# Patient Record
Sex: MALE | Race: WHITE | NOT HISPANIC OR LATINO | Employment: OTHER | URBAN - METROPOLITAN AREA
[De-identification: names, ages, dates, MRNs, and addresses within clinical notes are randomized per-mention and may not be internally consistent; named-entity substitution may affect disease eponyms.]

---

## 2019-01-31 ENCOUNTER — HOSPITAL ENCOUNTER (OUTPATIENT)
Dept: CT IMAGING | Facility: HOSPITAL | Age: 74
Discharge: HOME | End: 2019-01-31
Attending: SURGERY

## 2019-01-31 LAB
CREAT BLD-MCNC: 1.8 MG/DL (ref 0.6–1.4)
GFR SERPLBLD BASED ON 1.73 SQ M-ARVRAT: 36 ML/MIN/{1.73_M2}

## 2020-02-03 ENCOUNTER — HOSPITAL ENCOUNTER (OUTPATIENT)
Dept: CT IMAGING | Facility: HOSPITAL | Age: 75
Discharge: HOME | End: 2020-02-03
Attending: SURGERY

## 2020-02-03 LAB
CREAT BLD-MCNC: 1.9 MG/DL (ref 0.6–1.4)
GFR SERPLBLD BASED ON 1.73 SQ M-ARVRAT: 34 ML/MIN/{1.73_M2}

## 2020-08-14 ENCOUNTER — OFFICE VISIT CONVERTED (OUTPATIENT)
Dept: CARDIOLOGY | Facility: CLINIC | Age: 75
End: 2020-08-14
Attending: INTERNAL MEDICINE

## 2020-08-24 ENCOUNTER — CONVERSION ENCOUNTER (OUTPATIENT)
Dept: CARDIOLOGY | Facility: CLINIC | Age: 75
End: 2020-08-24
Attending: INTERNAL MEDICINE

## 2020-08-24 ENCOUNTER — HOSPITAL ENCOUNTER (OUTPATIENT)
Dept: CARDIOLOGY | Facility: HOSPITAL | Age: 75
Discharge: HOME | End: 2020-08-24
Attending: INTERNAL MEDICINE

## 2020-12-04 ENCOUNTER — TELEMEDICINE CONVERTED (OUTPATIENT)
Dept: CARDIOLOGY | Facility: CLINIC | Age: 75
End: 2020-12-04
Attending: INTERNAL MEDICINE

## 2020-12-17 ENCOUNTER — TRANSCRIBE ORDERS (OUTPATIENT)
Dept: ADMINISTRATIVE | Facility: HOSPITAL | Age: 75
End: 2020-12-17

## 2020-12-17 ENCOUNTER — HOSPITAL ENCOUNTER (OUTPATIENT)
Dept: CARDIOLOGY | Facility: HOSPITAL | Age: 75
Discharge: HOME | End: 2020-12-17

## 2020-12-17 ENCOUNTER — LAB (OUTPATIENT)
Dept: LAB | Facility: HOSPITAL | Age: 75
End: 2020-12-17

## 2020-12-17 DIAGNOSIS — N20.0 LEFT RENAL STONE: ICD-10-CM

## 2020-12-17 DIAGNOSIS — Z01.818 PREOPERATIVE CLEARANCE: Primary | ICD-10-CM

## 2020-12-17 DIAGNOSIS — Z01.818 PREOPERATIVE CLEARANCE: ICD-10-CM

## 2020-12-17 LAB
ANION GAP SERPL CALCULATED.3IONS-SCNC: 12.4 MMOL/L (ref 5–15)
BASOPHILS # BLD AUTO: 0.03 10*3/MM3 (ref 0–0.2)
BASOPHILS NFR BLD AUTO: 0.4 % (ref 0–1.5)
BUN SERPL-MCNC: 26 MG/DL (ref 8–23)
BUN/CREAT SERPL: 15.2 (ref 7–25)
CALCIUM SPEC-SCNC: 9.2 MG/DL (ref 8.6–10.5)
CHLORIDE SERPL-SCNC: 104 MMOL/L (ref 98–107)
CO2 SERPL-SCNC: 24.6 MMOL/L (ref 22–29)
CREAT SERPL-MCNC: 1.71 MG/DL (ref 0.76–1.27)
DEPRECATED RDW RBC AUTO: 44.2 FL (ref 37–54)
EOSINOPHIL # BLD AUTO: 0.06 10*3/MM3 (ref 0–0.4)
EOSINOPHIL NFR BLD AUTO: 0.9 % (ref 0.3–6.2)
ERYTHROCYTE [DISTWIDTH] IN BLOOD BY AUTOMATED COUNT: 13.1 % (ref 12.3–15.4)
GFR SERPL CREATININE-BSD FRML MDRD: 39 ML/MIN/1.73
GLUCOSE SERPL-MCNC: 135 MG/DL (ref 65–99)
HCT VFR BLD AUTO: 42.5 % (ref 37.5–51)
HGB BLD-MCNC: 13.5 G/DL (ref 13–17.7)
IMM GRANULOCYTES # BLD AUTO: 0.03 10*3/MM3 (ref 0–0.05)
IMM GRANULOCYTES NFR BLD AUTO: 0.4 % (ref 0–0.5)
LYMPHOCYTES # BLD AUTO: 0.91 10*3/MM3 (ref 0.7–3.1)
LYMPHOCYTES NFR BLD AUTO: 13.6 % (ref 19.6–45.3)
MCH RBC QN AUTO: 29.3 PG (ref 26.6–33)
MCHC RBC AUTO-ENTMCNC: 31.8 G/DL (ref 31.5–35.7)
MCV RBC AUTO: 92.2 FL (ref 79–97)
MONOCYTES # BLD AUTO: 0.68 10*3/MM3 (ref 0.1–0.9)
MONOCYTES NFR BLD AUTO: 10.2 % (ref 5–12)
NEUTROPHILS NFR BLD AUTO: 4.97 10*3/MM3 (ref 1.7–7)
NEUTROPHILS NFR BLD AUTO: 74.5 % (ref 42.7–76)
NRBC BLD AUTO-RTO: 0 /100 WBC (ref 0–0.2)
PLATELET # BLD AUTO: 168 10*3/MM3 (ref 140–450)
PMV BLD AUTO: 12.4 FL (ref 6–12)
POTASSIUM SERPL-SCNC: 4.6 MMOL/L (ref 3.5–5.2)
RBC # BLD AUTO: 4.61 10*6/MM3 (ref 4.14–5.8)
SODIUM SERPL-SCNC: 141 MMOL/L (ref 136–145)
WBC # BLD AUTO: 6.68 10*3/MM3 (ref 3.4–10.8)

## 2020-12-17 PROCEDURE — 85025 COMPLETE CBC W/AUTO DIFF WBC: CPT

## 2020-12-17 PROCEDURE — U0004 COV-19 TEST NON-CDC HGH THRU: HCPCS

## 2020-12-17 PROCEDURE — 93005 ELECTROCARDIOGRAM TRACING: CPT | Performed by: UROLOGY

## 2020-12-17 PROCEDURE — 93010 ELECTROCARDIOGRAM REPORT: CPT | Performed by: INTERNAL MEDICINE

## 2020-12-17 PROCEDURE — C9803 HOPD COVID-19 SPEC COLLECT: HCPCS

## 2020-12-17 PROCEDURE — 36415 COLL VENOUS BLD VENIPUNCTURE: CPT

## 2020-12-17 PROCEDURE — 80048 BASIC METABOLIC PNL TOTAL CA: CPT

## 2020-12-18 LAB
QT INTERVAL: 436 MS
SARS-COV-2 RNA RESP QL NAA+PROBE: NOT DETECTED

## 2021-05-05 ENCOUNTER — ANESTHESIA (OUTPATIENT)
Dept: PERIOP | Facility: HOSPITAL | Age: 76
End: 2021-05-05

## 2021-05-05 ENCOUNTER — ANESTHESIA EVENT (OUTPATIENT)
Dept: PERIOP | Facility: HOSPITAL | Age: 76
End: 2021-05-05

## 2021-05-05 ENCOUNTER — HOSPITAL ENCOUNTER (OUTPATIENT)
Facility: HOSPITAL | Age: 76
Setting detail: HOSPITAL OUTPATIENT SURGERY
Discharge: HOME | End: 2021-05-05
Attending: UROLOGY | Admitting: UROLOGY

## 2021-05-05 VITALS
TEMPERATURE: 96.6 F | SYSTOLIC BLOOD PRESSURE: 171 MMHG | HEART RATE: 77 BPM | HEIGHT: 74 IN | DIASTOLIC BLOOD PRESSURE: 77 MMHG | OXYGEN SATURATION: 95 % | BODY MASS INDEX: 18.79 KG/M2 | WEIGHT: 146.4 LBS | RESPIRATION RATE: 14 BRPM

## 2021-05-05 DIAGNOSIS — N20.1 CALCULUS OF URETER: Primary | ICD-10-CM

## 2021-05-05 LAB
ANION GAP SERPL CALCULATED.3IONS-SCNC: 12 MMOL/L (ref 5–15)
BUN SERPL-MCNC: 31 MG/DL (ref 8–23)
BUN/CREAT SERPL: 16.6 (ref 7–25)
CALCIUM SPEC-SCNC: 8.8 MG/DL (ref 8.6–10.5)
CHLORIDE SERPL-SCNC: 105 MMOL/L (ref 98–107)
CO2 SERPL-SCNC: 23 MMOL/L (ref 22–29)
CREAT SERPL-MCNC: 1.87 MG/DL (ref 0.76–1.27)
DEPRECATED RDW RBC AUTO: 52.5 FL (ref 37–54)
ERYTHROCYTE [DISTWIDTH] IN BLOOD BY AUTOMATED COUNT: 16.9 % (ref 12.3–15.4)
GFR SERPL CREATININE-BSD FRML MDRD: 35 ML/MIN/1.73
GLUCOSE SERPL-MCNC: 133 MG/DL (ref 65–99)
HCT VFR BLD AUTO: 43.3 % (ref 37.5–51)
HGB BLD-MCNC: 14 G/DL (ref 13–17.7)
MCH RBC QN AUTO: 28.9 PG (ref 26.6–33)
MCHC RBC AUTO-ENTMCNC: 32.5 G/DL (ref 31.5–35.7)
MCV RBC AUTO: 89.1 FL (ref 79–97)
PLATELET # BLD AUTO: 154 10*3/MM3 (ref 140–450)
PMV BLD AUTO: 9.5 FL (ref 6–12)
POTASSIUM SERPL-SCNC: 4.5 MMOL/L (ref 3.5–5.2)
RBC # BLD AUTO: 4.86 10*6/MM3 (ref 4.14–5.8)
SARS-COV-2 RNA PNL SPEC NAA+PROBE: NOT DETECTED
SODIUM SERPL-SCNC: 140 MMOL/L (ref 136–145)
WBC # BLD AUTO: 8.5 10*3/MM3 (ref 3.4–10.8)

## 2021-05-05 PROCEDURE — 93010 ELECTROCARDIOGRAM REPORT: CPT | Performed by: INTERNAL MEDICINE

## 2021-05-05 PROCEDURE — 80048 BASIC METABOLIC PNL TOTAL CA: CPT | Performed by: ANESTHESIOLOGY

## 2021-05-05 PROCEDURE — 0 IOHEXOL 300 MG/ML SOLUTION: Performed by: UROLOGY

## 2021-05-05 PROCEDURE — 25010000002 PROPOFOL 10 MG/ML EMULSION: Performed by: REGISTERED NURSE

## 2021-05-05 PROCEDURE — U0005 INFEC AGEN DETEC AMPLI PROBE: HCPCS | Performed by: UROLOGY

## 2021-05-05 PROCEDURE — U0003 INFECTIOUS AGENT DETECTION BY NUCLEIC ACID (DNA OR RNA); SEVERE ACUTE RESPIRATORY SYNDROME CORONAVIRUS 2 (SARS-COV-2) (CORONAVIRUS DISEASE [COVID-19]), AMPLIFIED PROBE TECHNIQUE, MAKING USE OF HIGH THROUGHPUT TECHNOLOGIES AS DESCRIBED BY CMS-2020-01-R: HCPCS | Performed by: UROLOGY

## 2021-05-05 PROCEDURE — C1769 GUIDE WIRE: HCPCS | Performed by: UROLOGY

## 2021-05-05 PROCEDURE — C2617 STENT, NON-COR, TEM W/O DEL: HCPCS | Performed by: UROLOGY

## 2021-05-05 PROCEDURE — 85027 COMPLETE CBC AUTOMATED: CPT | Performed by: ANESTHESIOLOGY

## 2021-05-05 PROCEDURE — 25010000002 ONDANSETRON PER 1 MG: Performed by: REGISTERED NURSE

## 2021-05-05 PROCEDURE — 25010000002 FENTANYL CITRATE (PF) 100 MCG/2ML SOLUTION: Performed by: REGISTERED NURSE

## 2021-05-05 PROCEDURE — 93005 ELECTROCARDIOGRAM TRACING: CPT | Performed by: UROLOGY

## 2021-05-05 PROCEDURE — C1758 CATHETER, URETERAL: HCPCS | Performed by: UROLOGY

## 2021-05-05 DEVICE — URETERAL STENT
Type: IMPLANTABLE DEVICE | Site: URETER | Status: FUNCTIONAL
Brand: PERCUFLEX™ PLUS

## 2021-05-05 RX ORDER — OMEPRAZOLE 20 MG/1
20 CAPSULE, DELAYED RELEASE ORAL DAILY
COMMUNITY

## 2021-05-05 RX ORDER — CEPHALEXIN 500 MG/1
500 CAPSULE ORAL 3 TIMES DAILY
Qty: 15 CAPSULE | Refills: 0 | Status: SHIPPED | OUTPATIENT
Start: 2021-05-05 | End: 2021-05-10

## 2021-05-05 RX ORDER — NALOXONE HCL 0.4 MG/ML
0.4 VIAL (ML) INJECTION AS NEEDED
Status: DISCONTINUED | OUTPATIENT
Start: 2021-05-05 | End: 2021-05-05 | Stop reason: HOSPADM

## 2021-05-05 RX ORDER — METOPROLOL TARTRATE 50 MG/1
50 TABLET, FILM COATED ORAL 2 TIMES DAILY
COMMUNITY

## 2021-05-05 RX ORDER — ONDANSETRON 2 MG/ML
INJECTION INTRAMUSCULAR; INTRAVENOUS AS NEEDED
Status: DISCONTINUED | OUTPATIENT
Start: 2021-05-05 | End: 2021-05-05 | Stop reason: SURG

## 2021-05-05 RX ORDER — FENTANYL CITRATE 50 UG/ML
INJECTION, SOLUTION INTRAMUSCULAR; INTRAVENOUS AS NEEDED
Status: DISCONTINUED | OUTPATIENT
Start: 2021-05-05 | End: 2021-05-05 | Stop reason: SURG

## 2021-05-05 RX ORDER — LIDOCAINE HYDROCHLORIDE 10 MG/ML
INJECTION, SOLUTION EPIDURAL; INFILTRATION; INTRACAUDAL; PERINEURAL AS NEEDED
Status: DISCONTINUED | OUTPATIENT
Start: 2021-05-05 | End: 2021-05-05 | Stop reason: SURG

## 2021-05-05 RX ORDER — FENTANYL CITRATE 50 UG/ML
25 INJECTION, SOLUTION INTRAMUSCULAR; INTRAVENOUS
Status: DISCONTINUED | OUTPATIENT
Start: 2021-05-05 | End: 2021-05-05 | Stop reason: HOSPADM

## 2021-05-05 RX ORDER — HYDROCODONE BITARTRATE AND ACETAMINOPHEN 5; 325 MG/1; MG/1
1 TABLET ORAL EVERY 6 HOURS PRN
Qty: 15 TABLET | Refills: 0 | Status: SHIPPED | OUTPATIENT
Start: 2021-05-05

## 2021-05-05 RX ORDER — SODIUM CHLORIDE 0.9 % (FLUSH) 0.9 %
10 SYRINGE (ML) INJECTION AS NEEDED
Status: DISCONTINUED | OUTPATIENT
Start: 2021-05-05 | End: 2021-05-05 | Stop reason: HOSPADM

## 2021-05-05 RX ORDER — PROPOFOL 10 MG/ML
VIAL (ML) INTRAVENOUS AS NEEDED
Status: DISCONTINUED | OUTPATIENT
Start: 2021-05-05 | End: 2021-05-05 | Stop reason: SURG

## 2021-05-05 RX ORDER — LIDOCAINE HYDROCHLORIDE 10 MG/ML
0.5 INJECTION, SOLUTION INFILTRATION; PERINEURAL ONCE AS NEEDED
Status: DISCONTINUED | OUTPATIENT
Start: 2021-05-05 | End: 2021-05-05 | Stop reason: HOSPADM

## 2021-05-05 RX ORDER — ASPIRIN 81 MG/1
81 TABLET ORAL DAILY
COMMUNITY

## 2021-05-05 RX ORDER — DOCUSATE SODIUM 100 MG/1
100 CAPSULE, LIQUID FILLED ORAL 2 TIMES DAILY PRN
Qty: 30 CAPSULE | Refills: 1 | Status: SHIPPED | OUTPATIENT
Start: 2021-05-05

## 2021-05-05 RX ORDER — SODIUM CHLORIDE, SODIUM LACTATE, POTASSIUM CHLORIDE, CALCIUM CHLORIDE 600; 310; 30; 20 MG/100ML; MG/100ML; MG/100ML; MG/100ML
20 INJECTION, SOLUTION INTRAVENOUS CONTINUOUS
Status: DISCONTINUED | OUTPATIENT
Start: 2021-05-05 | End: 2021-05-05 | Stop reason: HOSPADM

## 2021-05-05 RX ADMIN — CEFAZOLIN SODIUM 2 G: 1 INJECTION, POWDER, FOR SOLUTION INTRAMUSCULAR; INTRAVENOUS at 13:20

## 2021-05-05 RX ADMIN — SODIUM CHLORIDE, POTASSIUM CHLORIDE, SODIUM LACTATE AND CALCIUM CHLORIDE 20 ML/HR: 600; 310; 30; 20 INJECTION, SOLUTION INTRAVENOUS at 12:03

## 2021-05-05 RX ADMIN — LIDOCAINE HYDROCHLORIDE 50 MG: 10 INJECTION, SOLUTION EPIDURAL; INFILTRATION; INTRACAUDAL; PERINEURAL at 13:21

## 2021-05-05 RX ADMIN — FENTANYL CITRATE 100 MCG: 50 INJECTION, SOLUTION INTRAMUSCULAR; INTRAVENOUS at 13:21

## 2021-05-05 RX ADMIN — ONDANSETRON 4 MG: 2 INJECTION INTRAMUSCULAR; INTRAVENOUS at 13:34

## 2021-05-05 RX ADMIN — PROPOFOL 200 MG: 10 INJECTION, EMULSION INTRAVENOUS at 13:21

## 2021-05-05 NOTE — ANESTHESIA POSTPROCEDURE EVALUATION
Patient: Javed Mello    Procedure Summary     Date: 05/05/21 Room / Location: [facility] OR 01 / [facility] MAIN OR    Anesthesia Start: 1315 Anesthesia Stop: 1432    Procedure: URETEROSCOPY with laser lithotripsy / stone basket extraction/ stent (Right ) Diagnosis:       Renal and ureteric calculus      Right renal stone      (Renal and ureteric calculus [N20.2])      (Right renal stone [N20.0])    Surgeons: Ranjeet Leon MD Provider: Gerber Pratt MD    Anesthesia Type: general ASA Status: 3          Anesthesia Type: general    Vitals  Vitals Value Taken Time   /104 05/05/21 1518   Temp 96.8 °F (36 °C) 05/05/21 1515   Pulse 80 05/05/21 1517   Resp 12 05/05/21 1515   SpO2 99 % 05/05/21 1517   Vitals shown include unvalidated device data.        Post Anesthesia Care and Evaluation    Pain scale: See nurse's notes for pain score.  Pain management: adequate  Airway patency: patent  Anesthetic complications: No anesthetic complications  PONV Status: none  Cardiovascular status: acceptable  Respiratory status: acceptable  Hydration status: acceptable    Comments: Patient seen and examined postoperatively; vital signs stable; SpO2 greater than or equal to 90%; cardiopulmonary status stable; nausea/vomiting adequately controlled; pain adequately controlled; no apparent anesthesia complications; patient discharged from anesthesia care when discharge criteria were met

## 2021-05-05 NOTE — ANESTHESIA PROCEDURE NOTES
Airway  Urgency: elective    Date/Time: 5/5/2021 1:23 PM    General Information and Staff    Patient location during procedure: OR  CRNA: Nora Pacheco CRNA    Indications and Patient Condition  Indications for airway management: airway protection    Preoxygenated: yes  MILS maintained throughout  Mask difficulty assessment: 0 - not attempted    Final Airway Details  Final airway type: supraglottic airway      Successful airway: unique  Size 4    Number of attempts at approach: 1  Assessment: lips, teeth, and gum same as pre-op

## 2021-05-05 NOTE — ANESTHESIA PREPROCEDURE EVALUATION
Anesthesia Evaluation     Patient summary reviewed and Nursing notes reviewed   NPO Solid Status: > 8 hours  NPO Liquid Status: > 8 hours           Airway   Mallampati: II  TM distance: >3 FB  Neck ROM: full  No difficulty expected  Dental      Pulmonary    (+) COPD mild, shortness of breath,   Cardiovascular   Exercise tolerance: good (4-7 METS)    (+) dysrhythmias Atrial Fib, PVD,       Neuro/Psych  GI/Hepatic/Renal/Endo      Musculoskeletal     Abdominal    Substance History      OB/GYN          Other                        Anesthesia Plan    ASA 3     general     intravenous induction     Anesthetic plan, all risks, benefits, and alternatives have been provided, discussed and informed consent has been obtained with: patient.    Plan discussed with CRNA.

## 2021-05-10 NOTE — H&P
History and Physical      Patient Name: Javed Mello   Patient ID: [id number]   Sex: Male   YOB: 1945    Primary Care Provider: Marianna Nguyen MD   Referring Provider: Marianna Nguyen MD    Visit Date: August 14, 2020    Provider: Manny Whitaker MD   Location: [facility]   Location Address: [address] [place] [address], [facility]   [place], [place]  [id number]   Location Phone: [phone]          Chief Complaint  · Palpitations/fluttering      History Of Present Illness  Consult requested by: Marianna Nguyen MD   Javed Mello is a pleasant, 75 year old, white male with a history of chronic atrial fibrillation, COPD, peripheral arterial disease and hypertension. He was recently hospitalized at [facility] in [place] in July due to lower gastrointestinal bleeding. A colonoscopy showed multiple large polyps in his colon at the splenic flexure. Biopsies were negative for dysplasia or malignancy, fortunately. Mr. Mello was previously on chronic anti-coagulation with Warfarin due to his chronic atrial fibrillation, but this was discontinued. He is scheduled to have a repeat colonoscopy in several weeks to remove these polyps. He was also noted to have acute renal failure during his recent hospital admission with a creatinine level up to 4. The patient was found to have severe renal artery stenosis and underwent renal artery stenting. His most recent creatinine level was improved back to his baseline of 1.6. He has multiple family members present with him today. Their main concern is his blood pressure has frequently been elevated since his recent hospital discharge. He reports some rare palpitations, but his heart rate is well controlled at 78 beats per minute today. He remains on chronic rate control with Metoprolol. Mr. Mello does not report any episodes of chest pain/pressure or any orthopnea, PND, peripheral edema, syncope or lightheadedness. His mild chronic  exertional dyspnea remains stable and unchanged. Unfortunately, he continues to smoke a limited amount. Per his home blood pressure log, his systolic blood pressure readings have frequently been in the 170s to 180s, although his BP was normal at 116/84 today. He reports good compliance with his home medications.   PAST MEDICAL HISTORY: COPD/emphysema; chronic atrial fibrillation, previously on chronic anti-coagulation with Warfarin; hypertension; Stage 3 chronic kidney disease; lower gastrointestinal bleeding; peripheral arterial disease with previous abdominal aortic aneurysm repair and previous renal artery stenting; diverticulosis; prostate cancer; chronic low-back pain; nephrolithiasis. PAST SURGICAL HISTORY: Prostatectomy; colonoscopy; AAA repair.   PSYCHOSOCIAL HISTORY: He continues to smoke a few cigarettes daily and has been smoking for many years. No history of alcohol abuse or illicit drug use.   FAMILY HISTORY: Positive for hypertension in multiple family members, including his father. Negative for premature coronary artery disease or diabetes mellitus.   CURRENT MEDICATIONS: include Nifedipine 30 mg daily; Metoprolol 50 mg b.i.d.; Omeprazole 20 mg b.i.d.; Trelegy; Albuterol; aspirin 81 mg daily; ferrous gluconate The dosage and frequency of the medications were reviewed with the patient.   ALLERGIES: Penicillin.       Review of Systems  · Constitutional  o Admits  o : fatigue  o Denies  o : good general health lately, recent weight changes   · Eyes  o Denies  o : double vision  · HENT  o Denies  o : hearing loss or ringing, chronic sinus problem, swollen glands in neck  · Cardiovascular  o Admits  o : palpitations (fast, fluttering, or skipping beats), shortness of breath while walking or lying flat  o Denies  o : chest pain, swelling (feet, ankles, hands)  · Respiratory  o Admits  o : chronic or frequent cough, COPD  o Denies  o : asthma or wheezing  · Gastrointestinal  o Denies  o : ulcers, nausea or  "vomiting  · Neurologic  o Admits  o : lightheaded or dizzy, headaches  o Denies  o : stroke  · Musculoskeletal  o Admits  o : back pain  o Denies  o : joint pain  · Endocrine  o Admits  o : heat or cold intolerance, excessive thirst or urination  o Denies  o : thyroid disease, diabetes  · Heme-Lymph  o Admits  o : bleeding or bruising tendency, anemia      Vitals  Date Time BP Position Site L\R Cuff Size HR RR TEMP (F) WT  HT  BMI kg/m2 BSA m2 O2 Sat        08/14/2020 12:56 /84 Sitting    78 - R   149lbs 0oz 6'  2\" 19.13 1.88           Physical Examination  · Constitutional  o Appearance  o : Awake, alert, in no acute distress.  · Head and Face  o HEENT  o : No pallor, anicteric. Eyes normal. Moist mucous membranes.  · Neck  o Inspection/Palpation  o : Supple. No hepatosplenomegaly.  o Jugular Veins  o : No JVD. No carotid bruits.  · Respiratory  o Auscultation of Lungs  o : Equal air entry bilaterally. Prolonged expiratory phase. Few scattered rhonchi bilaterally. No wheezing. No rales. No tachypnea. Normal effort.  · Cardiovascular  o Heart  o : Irregularly irregular rhythm, regular rate. Normal S1 and S2 present. No S3 or S4 gallop. No murmur or friction rub. No heave.  · Gastrointestinal  o Abdominal Examination  o : Soft, non-distended, non-tender. Normal active bowel sounds throughout. No masses. No organomegaly.   · Musculoskeletal  o General  o : Normal muscle tone and strength.  · Skin and Subcutaneous Tissue  o General Inspection  o : Warm and dry. No rashes or lesions.  · Neurologic  o Cranial Nerves  o : Normal speech. Cranial nerves 2-12 intact. No focal deficits.  · Extremities  o Extremities  o : No cyanosis or edema. 2+ radial pulses bilaterally. Diminished posterior tibial pulses bilaterally.     Labs from [facility] on August 11, 2020 - CBC:  White blood cells 6.9 hemoglobin 10.1, hematocrit 32.0, platelets 200.  Chemistry:  Sodium 138, potassium 4.7, chloride 109, " bicarb 25, BUN 22, creatinine 1.6, glucose 108.  Liver function tests:  AST 17, ALT 8, total bilirubin 0.4, alkaline phosphatase 59, total protein 6.1, albumin 3.1.      Patient's recent EKG from [facility] on July 22 was reviewed and showed atrial fibrillation with a heart rate of 76 beats per minute and diffuse non-specific ST changes.  There is no other EKG available for comparison.               Assessment     1.  Chronic atrial fibrillation:  Per review of old records, the patient has had chronic atrial fibrillation for at least       5 to 6 years.  He was previously on anti-coagulation with Warfarin, but this was held due to recent lower-GI       bleeding.    2.  COPD with chronic tobacco abuse.  3.  Peripheral arterial disease, s/p recent renal artery stenting and previous history of AAA stent graft repair.  4.  Stage 3 chronic kidney disease.  5.  Hypertension, currently uncontrolled.  6.  Anemia, likely multifactorial secondary to recent blood loss and chronic kidney disease.       Plan     His blood pressure remains suboptimally controlled at present, so will increase his Nifedipine dose from 30 to 60 mg daily.  Mr. Mello was instructed to maintain his blood pressure log and call if he develops any hypotension or significant symptoms on the higher dose of Nifedipine.  Will restart chronic anti-coagulation at this time due to the patient's elevated CHADS2-VASc score and significant stroke risk.  I am going to start Eliquis 5 mg b.i.d. instead of Warfarin given the improved safety profile.  He understands that he will need to hold Eliquis for at least 48 hours prior to the planned upcoming colonoscopy.  If he has any evidence of recurrent GI bleeding, will likely have to hold anti-coagulation.  He has not had any cardiac evaluation in at least 5 years, so we will obtain an echocardiogram to assess his left atrial size and global left ventricular systolic function.  He reports [place] Class 3  bilateral lower-extremity claudication symptoms as well as significant leg weakness, so we will obtain ABIs for further evaluation.  I will plan to see him back in the office in several weeks for reassessment.    Manny Whitaker MD  BP/dmd           This note was transcribed by Lenora Ortiz.  dmd/BP  The above service was transcribed by Lenora Ortiz, and I attest to the accuracy of the note.  BP                 Electronically Signed by: Lenora Ortiz-, -Author on August 21, 2020 12:06:29 PM  Electronically Co-signed by: Manny Whitaker MD -Reviewer on August 25, 2020 08:53:25 AM

## 2021-05-10 NOTE — PROCEDURES
"   Procedure Note      Patient Name: Javed Mello   Patient ID: [id number]   Sex: Male   YOB: 1945    Primary Care Provider: Marianna Nguyen MD   Referring Provider: Marianna Nguyen MD    Visit Date: August 24, 2020    Provider: Manny Whitaker MD   Location: [facility]   Location Address: [address] [place] [address] A   [place], [place]  [id number]   Location Phone: [phone]          FINAL REPORT   TRANSTHORACIC ECHOCARDIOGRAM REPORT    Diagnosis: Atrial fibrillation.   Height: 6'2\" Weight: 149 B/P: 116/84 BSA: 1.9   Tech: BNS   MEASUREMENTS:  RVID (Diastole) : RVID. (NORMAL: 0.7 to 2.4 cm max)   LVID (Systole): 3.4 cm (Diastole): 4.2 cm . (NORMAL: 3.7 - 5.4 cm)   Posterior Wall Thickness (Diastole): 1.1 cm. (NORMAL: 0.8 - 1.1 cm)   Septal Thickness (Diastole): 1.1 cm. (NORMAL: 0.7 - 1.2 cm)   LAID (Systole): 5.2 cm. (NORMAL: 1.9 - 3.8 cm)   Aortic Root Diameter (Diastole): 2.9 cm. (NORMAL: 2.0 - 3.7 cm)   COMMENTS:  The patient underwent 2-D, M-Mode, and Doppler examination, including pulse-wave, continuous-wave, and color-flow Doppler analysis; the study is technically adequate. The following findings were noted:   FINDINGS:  MITRAL VALVE: The mitral leaflets are mildly thickened. There is mild prolapse of the posterior mitral leaflet. No evidence of mitral stenosis. Mild to moderate mitral regurgitation.   AORTIC VALVE: Trileaflet aortic valve, which opens well. No evidence of aortic stenosis or aortic regurgitation.   TRICUSPID VALVE: Normal valve morphology and systolic excursion. Moderate tricuspid regurgitation. Estimated right ventricular systolic pressure was elevated at 45-50 mmHg.   PULMONIC VALVE: Grossly normal. No pulmonic stenosis. Trivial pulmonic regurgitation per color Doppler imaging.   AORTIC ROOT: Normal in size with adequate motion.   LEFT ATRIUM: Severe dilated left atrium. LA volume index is 79 mL/m2. No intracavitary thrombus or mass " visualized. Color Doppler imaging across the interatrial septum showed no evidence of PFO/shunting.   LEFT VENTRICLE: Normal left ventricular size. Borderline concentric left ventricular hypertrophy. No left ventricular thrombus or mass visualized. Mildly reduced left ventricular systolic function with an estimated ejection fraction of 45-50% and mild global hypokinesis. No regional wall motion abnormalities were observed. Diastolic function could not be fully assessed secondary to atrial fibrillation, but the tissue Doppler findings were consistent with normal left ventricular filling pressure.   RIGHT VENTRICLE: Normal right ventricular size and systolic function.   RIGHT ATRIUM: Severely dilated right atrium.   PERICARDIUM: No pericardial effusion.   INFERIOR VENA CAVA: Normal IVC size and respirophasic changes.   DOPPLER: E/A ratio is Afib. DT= 259 msec. IVRT is N/A msec. E/e' is 7.   Faxed: 08/25/2020      CONCLUSIONS:  1.  Mildly reduced left ventricular systolic function with an estimated ejection fraction of 45-50% and mild        global hypokinesis.  No regional wall motion abnormalities were observed.   2.  Borderline concentric LVH.   3.  Severe bi-atrial enlargement.   4.  Mildly thickened mitral leaflets with mild prolapse of the posterior mitral leaflet.    5.  Mild to moderate mitral regurgitation.    6.  Moderate tricuspid regurgitation.    7.  Estimated pulmonary artery systolic pressure was elevated at 45-50 mmHg.      MD JUS Gray/pap      This note was transcribed by Precious Roe.  pap/jus  The above service was transcribed by Precious Roe, and I attest to the accuracy of the note.  BAP                 Electronically Signed by: Radha Roe-, Other -Author on August 25, 2020 03:08:09 PM  Electronically Co-signed by: Manny Whitaker MD -Reviewer on August 26, 2020 09:56:11 AM

## 2021-05-13 NOTE — PROGRESS NOTES
Progress Note      Patient Name: Javed Mello   Patient ID: [id number]   Sex: Male   YOB: 1945    Primary Care Provider: Marianna Nguyen MD   Referring Provider: Marianna Nguyen MD    Visit Date: December 4, 2020    Provider: Manny Whitaker MD   Location: [facility]   Location Address: [address] [place] [address], [facility]   [place], [place]  [id number]   Location Phone: [phone]          Chief Complaint     Blood pressure fluctuating.       History Of Present Illness  TELEHEALTH TELEPHONE VISIT  Javed Mello is a 75 year old male who presents for a routine followup visit. He states he has been feeling well, and does not report any new symptoms today. Specifically, Mr. Mello has not experienced any chest pain/pressure, or any palpitations, orthopnea, PND, peripheral edema, lightheadedness, or syncope. His mild chronic exertional dyspnea remains stable and unchanged since his last visit. He has a history of chronic atrial fibrillation, which has been under good rate control. He is on chronic anticoagulation with Eliquis 5 mg twice daily and states he is tolerating it well. He has not experienced any bleeding issues since starting Eliquis late this Summer, but did have lower gastrointestinal bleeding back in July 2020, for which he was treated at [facility] in [place]. His primary concern today is significant fluctuations in his blood pressure. He states he is now taking long-acting nifedipine 60 mg at bedtime, as well as Toprol XL 50 mg daily in the morning. Most of his blood pressure readings have been in the 130s to 140s/80s, but his wife states he is having occasional readings in the 160s to 170s/90s to 100s. He does not report any headaches, visual changes, or other significant problems. Evaluation via telehealth telephone visit. Verbal consent obtained before beginning visit.   Provider spent 10 minutes with the patient during the telehealth visit.   The following staff were  "present during this visit: Provider only.      PAST MEDICAL HISTORY: COPD/emphysema; Chronic atrial fibrillation, previously on chronic anticoagulation with warfarin; Hypertension; Chronic kidney disease, stage 3; Lower gastrointestinal bleeding; Peripheral arterial disease with previous abdominal aortic aneurysm repair and previous renal artery stenting; Diverticulosis; prostate cancer and prostatectomy; Chronic low back pain; Nephrolithiasis.     CURRENT MEDICATIONS:  Metoprolol ER 50 mg b.i.d.; Eliquis 5 mg b.i.d.; nifedipine ER 60 mg daily; omeprazole 20 mg b.i.d.; aspirin 81 mg daily; Trelegy.      ALLERGIES:  PENICILLIN.       Vitals     Weight 141.  Height 6'2\".           Assessment     1.  Chronic atrial fibrillation.  He continues to achieve good rate control on Toprol XL and does not report any        bleeding complications on anticoagulation on Eliquis.    2.  COPD with chronic tobacco abuse.  3.  Essential hypertension, remains suboptimally controlled at times.  4.  Stage 3 chronic kidney disease.  5.  Peripheral arterial disease status post renal artery stenting earlier this year and previous history of AAA        stent graft repair.       Plan     I instructed him to continue his current medications as he is taking them.  We will add chlorthalidone 25 mg daily as needed for systolic blood pressure readings greater than 160.  We will call this in to his pharmacy in [place] this afternoon.  I told him to continue to maintain a blood pressure log and let us know if his blood pressure is not improving with the use of chlorthalidone as needed.  If he is doing well, I will just plan to see him back in the office in 6 months.        Manny Whitaker MD  BP:vm             Electronically Signed by: Alissa Srivastava-, Other -Author on December 11, 2020 10:10:26 AM  Electronically Co-signed by: Angela Kaufman RN -Reviewer on December 11, 2020 02:51:46 PM  Electronically Co-signed by: Manny" JAGUAR Whitaker MD -Reviewer on December 15, 2020 11:23:43 AM

## 2021-05-15 VITALS
DIASTOLIC BLOOD PRESSURE: 84 MMHG | HEIGHT: 74 IN | BODY MASS INDEX: 19.12 KG/M2 | HEART RATE: 78 BPM | WEIGHT: 149 LBS | SYSTOLIC BLOOD PRESSURE: 116 MMHG

## 2021-06-11 LAB — QT INTERVAL: 470 MS

## 2021-08-03 ENCOUNTER — TRANSCRIBE ORDERS (OUTPATIENT)
Dept: ADMINISTRATIVE | Facility: HOSPITAL | Age: 76
End: 2021-08-03

## 2021-08-03 DIAGNOSIS — R42 DIZZINESS AND GIDDINESS: Primary | ICD-10-CM

## 2021-08-03 DIAGNOSIS — R42 DIZZINESS: ICD-10-CM

## 2021-08-10 ENCOUNTER — HOSPITAL ENCOUNTER (OUTPATIENT)
Dept: CT IMAGING | Facility: HOSPITAL | Age: 76
Discharge: HOME | End: 2021-08-10
Admitting: FAMILY MEDICINE

## 2021-08-10 ENCOUNTER — TRANSCRIBE ORDERS (OUTPATIENT)
Dept: ADMINISTRATIVE | Facility: HOSPITAL | Age: 76
End: 2021-08-10

## 2021-08-10 DIAGNOSIS — R42 GIDDINESS: ICD-10-CM

## 2021-08-10 DIAGNOSIS — R42 DIZZINESS AND GIDDINESS: ICD-10-CM

## 2021-08-10 DIAGNOSIS — R42 DIZZINESS: Primary | ICD-10-CM

## 2021-08-10 PROCEDURE — 70450 CT HEAD/BRAIN W/O DYE: CPT | Performed by: RADIOLOGY

## 2021-08-10 PROCEDURE — 70450 CT HEAD/BRAIN W/O DYE: CPT

## (undated) DEVICE — PRT BIOP SEALS

## (undated) DEVICE — CATH URETRL OPN/END 5F70CM

## (undated) DEVICE — FIBR LASR HOLMIUM 200 MICRON DISP

## (undated) DEVICE — SOL IRRG H2O BG 3000ML STRL

## (undated) DEVICE — SOLUTION,WATER,IRRIGATION,1000ML,STERILE: Brand: MEDLINE

## (undated) DEVICE — KT SURG TURNOVER 050

## (undated) DEVICE — NITINOL WIRE WITH HYDROPHILIC TIP: Brand: SENSOR

## (undated) DEVICE — PK CYSTO 50

## (undated) DEVICE — SYS IRR PUMP SGL ACTN VAC SYR 10CC

## (undated) DEVICE — GLV SURG SIGNATURE ESSENTIAL PF LTX SZ7

## (undated) DEVICE — DUAL LUMEN URETERAL CATHETER